# Patient Record
(demographics unavailable — no encounter records)

---

## 2024-12-18 NOTE — PHYSICAL EXAM
[General Appearance - In No Acute Distress] : no acute distress [Normal Station and Gait] : the gait and station were normal for the patient's age [No Focal Deficits] : no focal deficits [Oriented To Time, Place, And Person] : oriented to person, place, and time [Chaperone Present] : A chaperone was present in the examining room during all aspects of the physical examination [de-identified] : 1 cm area of vaginal erosion with healthy vaginal tissue surrounding.  Physical exam done in presence of Ángela Araiza RN. [FreeTextEntry2] : Ángela Araiza RN

## 2024-12-18 NOTE — ASSESSMENT
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 No sequela evident.  Continue Estrace.  Return to office 1 year with Dr. Gonzalez.  Sooner as needed.  Total time of encounter: 15 minutes.  This nonoccluded separately reported services or education.

## 2024-12-18 NOTE — HISTORY OF PRESENT ILLNESS
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 Patient has had 2 failed attempts at closure.  She is maintained on Estrace cream 3 times weekly.  Reports feeling well.  No recent spotting.  Feeling well.  No urinary tract infections over the last year.  Chronic urinary symptoms, stable.  UA negative for blood, nitrites, leukocytes.